# Patient Record
Sex: MALE | Race: OTHER | URBAN - NONMETROPOLITAN AREA
[De-identification: names, ages, dates, MRNs, and addresses within clinical notes are randomized per-mention and may not be internally consistent; named-entity substitution may affect disease eponyms.]

---

## 2017-02-14 NOTE — PROCEDURE NOTE: CLINICAL
PROCEDURE NOTE: Eylea 2mg OD. Diagnosis: Neovascular AMD with Active CNV. Anesthesia: Akten Gel 3.5%. Prep: Betadine Drops. Prior to injection, risks/benefits/alternatives discussed including infection, loss of vision, hemorrhage, cataract, glaucoma, retinal tears or detachment. The patient wished to proceed with treatment. Betadine prep was performed. Topical anesthesia was induced with Alcaine. Additional anesthesia was achieved using drop(s) or injection checked above. A drop of Povidone-iodine 5% ophthalmic solution was instilled over the injection site and in the inferior fornix. Using the syringe provided, Eylea 2.0mg in 0.05 cc was injected into the vitreous cavity. The needle was passed 3.0 mm posterior to the limbus in pseudophakic patients, and 3.5 mm posterior to the limbus in phakic patients. Patient tolerated procedure well. There were no complications. Injection time: 3:45 PM. The eye was irrigated with sterile irrigating solution. Post procedure instructions given. The patient was instructed to return for re-evaluation in approximately 4-12 weeks depending on his/her condition and was told to call immediately if vision decreases and/or if his/her eye becomes red, painful, and/or light sensitive. The patient was instructed to go to the emergency room or call 911 if unable to reach the doctor within an hour or two of trying or calling. The patient was instructed to use Artificial Tears q.i.d. p.r.n for comfort. Kelley Horner

## 2017-02-14 NOTE — PATIENT DISCUSSION
Based on today's exam and after a review of the records, the determination was made for EYLEA 2mg treatment within ONE WEEK AND AGAIN IN 9 WKS

## 2017-04-24 NOTE — PATIENT DISCUSSION
EYBRIIGTTEA AND REPEAT IN 9 WKS - LESS SRF AT 9-10 WKS THEN 10 WKS ON 12 14 16 - TO KEEP 9 WKS AS TRACE FLUID AT 9 AND [facility].

## 2017-04-26 NOTE — PROCEDURE NOTE: CLINICAL
PROCEDURE NOTE: Eylea 2mg OD. Diagnosis: Neovascular AMD with Active CNV. Anesthesia: Akten Gel 3.5%. Prep: Betadine Drops. Prior to injection, risks/benefits/alternatives discussed including infection, loss of vision, hemorrhage, cataract, glaucoma, retinal tears or detachment. The patient wished to proceed with treatment. Betadine prep was performed. Topical anesthesia was induced with Alcaine. Additional anesthesia was achieved using drop(s) or injection checked above. A drop of Povidone-iodine 5% ophthalmic solution was instilled over the injection site and in the inferior fornix. Using the syringe provided, Eylea 2.0mg in 0.05 cc was injected into the vitreous cavity. The remainder of the Eylea in the single-use vial was then discarded in a medical waste disposal container. The needle was passed 3.0 mm posterior to the limbus in pseudophakic patients, and 3.5 mm posterior to the limbus in phakic patients. Patient tolerated procedure well. There were no complications. Injection time: *. The eye was irrigated with sterile irrigating solution. Post procedure instructions given. The patient was instructed to return for re-evaluation in approximately 4-12 weeks depending on his/her condition and was told to call immediately if vision decreases and/or if his/her eye becomes red, painful, and/or light sensitive. The patient was instructed to go to the emergency room or call 911 if unable to reach the doctor within an hour or two of trying or calling. The patient was instructed to use Artificial Tears q.i.d. p.r.n for comfort. Josee Srinivasan

## 2017-07-26 NOTE — PROCEDURE NOTE: CLINICAL
PROCEDURE NOTE: Eylea 2mg OD. Diagnosis: Neovascular AMD with Active CNV. Anesthesia: Akten Gel 3.5%. Prep: Betadine Drops. Prior to injection, risks/benefits/alternatives discussed including infection, loss of vision, hemorrhage, cataract, glaucoma, retinal tears or detachment. The patient wished to proceed with treatment. Betadine prep was performed. Topical anesthesia was induced with Alcaine. Additional anesthesia was achieved using drop(s) or injection checked above. A drop of Povidone-iodine 5% ophthalmic solution was instilled over the injection site and in the inferior fornix. Using the syringe provided, Eylea 2.0mg in 0.05 cc was injected into the vitreous cavity. The remainder of the Eylea in the single-use vial was then discarded in a medical waste disposal container. The needle was passed 3.0 mm posterior to the limbus in pseudophakic patients, and 3.5 mm posterior to the limbus in phakic patients. Patient tolerated procedure well. There were no complications. Injection time: 9:58AM. The eye was irrigated with sterile irrigating solution. Post procedure instructions given. The patient was instructed to return for re-evaluation in approximately 4-12 weeks depending on his/her condition and was told to call immediately if vision decreases and/or if his/her eye becomes red, painful, and/or light sensitive. The patient was instructed to go to the emergency room or call 911 if unable to reach the doctor within an hour or two of trying or calling. The patient was instructed to use Artificial Tears q.i.d. p.r.n for comfort. Loco Kathleen

## 2017-09-27 NOTE — PATIENT DISCUSSION
EYLEA AND REPEAT IN 9 WKS X 2  - LESS SRF AT 9-10 WKS THEN 10 WKS ON 12 14 16 - TO KEEP 9 WKS AS TRACE FLUID AT 9 AND [facility].

## 2017-10-04 NOTE — PROCEDURE NOTE: CLINICAL
PROCEDURE NOTE: Eylea 2mg OD. Diagnosis: Neovascular AMD with Active CNV. Anesthesia: Akten Gel 3.5%. Prep: Betadine Drops. Prior to injection, risks/benefits/alternatives discussed including infection, loss of vision, hemorrhage, cataract, glaucoma, retinal tears or detachment. The patient wished to proceed with treatment. Betadine prep was performed. Topical anesthesia was induced with Alcaine. Additional anesthesia was achieved using drop(s) or injection checked above. A drop of Povidone-iodine 5% ophthalmic solution was instilled over the injection site and in the inferior fornix. Using the syringe provided, Eylea 2.0mg in 0.05 cc was injected into the vitreous cavity. The remainder of the Eylea in the single-use vial was then discarded in a medical waste disposal container. The needle was passed 3.0 mm posterior to the limbus in pseudophakic patients, and 3.5 mm posterior to the limbus in phakic patients. Patient tolerated procedure well. There were no complications. Injection time: *. The eye was irrigated with sterile irrigating solution. Post procedure instructions given. The patient was instructed to return for re-evaluation in approximately 4-12 weeks depending on his/her condition and was told to call immediately if vision decreases and/or if his/her eye becomes red, painful, and/or light sensitive. The patient was instructed to go to the emergency room or call 911 if unable to reach the doctor within an hour or two of trying or calling. The patient was instructed to use Artificial Tears q.i.d. p.r.n for comfort. Theresa Byrne

## 2017-12-11 NOTE — PROCEDURE NOTE: CLINICAL
PROCEDURE NOTE: Eylea 2mg OD. Diagnosis: Neovascular AMD with Active CNV. Anesthesia: Topical. Prep: Betadine Drops. Prior to injection, risks/benefits/alternatives discussed including infection, loss of vision, hemorrhage, cataract, glaucoma, retinal tears or detachment. The patient wished to proceed with treatment. Betadine prep was performed. Topical anesthesia was induced with Alcaine. Additional anesthesia was achieved using drop(s) or injection checked above. A drop of Povidone-iodine 5% ophthalmic solution was instilled over the injection site and in the inferior fornix. Using the syringe provided, Eylea 2.0mg in 0.05 cc was injected into the vitreous cavity. The remainder of the Eylea in the single-use vial was then discarded in a medical waste disposal container. The needle was passed 3.0 mm posterior to the limbus in pseudophakic patients, and 3.5 mm posterior to the limbus in phakic patients. Patient tolerated procedure well. There were no complications. Injection time: *. The eye was irrigated with sterile irrigating solution. Post procedure instructions given. The patient was instructed to return for re-evaluation in approximately 4-12 weeks depending on his/her condition and was told to call immediately if vision decreases and/or if his/her eye becomes red, painful, and/or light sensitive. The patient was instructed to go to the emergency room or call 911 if unable to reach the doctor within an hour or two of trying or calling. The patient was instructed to use Artificial Tears q.i.d. p.r.n for comfort. Eve Rasmussen

## 2018-02-05 NOTE — PATIENT DISCUSSION
3 3 16 ^SRF 11 WKS RETX AND DECREASE F/U TO 10 WKS.
5 19 16 ^SRF AT 10 WKS RETX W/IN 1 WK AND DECREASE F/U TO 8 WEEKS.
7 27 16 IMPROVED 9 WKS, RETX AND REPEAT 9 WKS.
9 16 15 ^ SRF AT 4 MTHS RETX EYLEA DECREASE F/U TO 3 MONTHS.
ARTIFICIAL TEARS to affected eye(s) as needed.
ATYPICAL AS MIN LEAK ON FA BUT SIG SRF - TO CONTINUE WITH ANTI-VEGF AS PT SEEMS TO BE RESPONDING.
BMI above normal limits, recommended weight loss, improve diet and follow up with internist.
Cataract DOES NOT appear visually significant.
Continue to MONITOR CLOSELY to determine the need for TREATMENT and INCREASE/DECREASE in length of time till next follow up visit.
Does NOT APPEAR VISUALLY SIGNIFICANT.
ERM does NOT APPEAR VISUALLY SIGNIFICANT.
EYBRIGITTEA AND REPEAT IN 9 WKS - LESS SRF AT 9-10 WKS THEN 10 WKS ON 12 14 16 - TO KEEP 9 WKS AS TRACE FLUID AT 9 AND [facility].
MONITOR response to TREATMENT.
My findings and recommendations TODAY are based on the patient's symptoms, exam, diagnostic testing and records.
NONSMOKER.
No retinal holes or tears seen on exam. Recommended observation. We reviewed the signs and symptoms of retinal tear/retinal detachment and the importance of prompt evaluation should there be increasing floaters, new flashing lights, or decreasing peripheral vision in either eye at any time. Patient understands condition, prognosis and need for follow up care.
No retinal tears or retinal detachment seen on clinical exam today. Reviewed the signs and symptoms of retinal tear/retinal detachment and the importance of calling for prompt evaluation should there be increasing floaters, new flashing lights, or decreasing peripheral vision in either eye at any time. Observation recommended.
OBSERVATION is recommended at this time. Patient understands condition, prognosis and need for follow up care. Patient instructed to call for any new reduction in vision or distortion.
POST INJECTION EVALUATION, no signs of new infection, tear, RD, VF, EOM, CNS, Vascular or other problems or side effect from previous injection(s).
Recommend OBSERVATION and continued MONITORING for progression to exudative AMD.
Recommended OBSERVATION and continued MONITORING for progression.
Recommended observation.
Recommended weight and BMI control through healthy diet and exercise, green leafy veggies, UV protection, and not smoking. Reviewed the benefits of AREDS II VITAMINS VERUS GENOTYPE directed vitamin therapy, and recommended following one or the other to try and prevent the progression of the disease. Reviewed the importance of daily monitoring of the vision in each eye independently, along with the use of the Amsler grid daily and instructed patient to call and return immediately for any new changes in their vision or on the Amsler grid. Patient instructed on the importance of regular follow up and monitoring for the early detection of conversion to wet AMD as early detection results in early treatment and better outcomes.
The patient is at high risk for a choroidal neovascular membrane. NO CNV SEEN TODAY. Dry ARMD is responsible for some decrease in vision.
Statement Selected

## 2018-02-13 NOTE — PATIENT DISCUSSION
ON 9 27 7 EYLEA AND REPEAT IN 9 WKS X 2  - LESS SRF AT 9-10 WKS THEN 10 WKS ON 12 14 16 - TO KEEP 9 WKS AS TRACE FLUID AT 9 AND [facility].

## 2018-02-13 NOTE — PATIENT DISCUSSION
ON 2 13 18 - EYLEA AND REPEAT IN 8 WKS - MILD SRF AT 9 WKS - MILD INCREASE - THEREFORE RETX AND REDUCE F/U TO 8 WKS.

## 2018-02-13 NOTE — PROCEDURE NOTE: CLINICAL
PROCEDURE NOTE: Eylea 2mg OD. Diagnosis: Neovascular AMD with Active CNV. Anesthesia: Topical. Prep: Betadine Drops. Prior to injection, risks/benefits/alternatives discussed including infection, loss of vision, hemorrhage, cataract, glaucoma, retinal tears or detachment. The patient wished to proceed with treatment. Betadine prep was performed. Topical anesthesia was induced with Alcaine. Additional anesthesia was achieved using drop(s) or injection checked above. A drop of Povidone-iodine 5% ophthalmic solution was instilled over the injection site and in the inferior fornix. Using the syringe provided, Eylea 2.0mg in 0.05 cc was injected into the vitreous cavity. The remainder of the Eylea in the single-use vial was then discarded in a medical waste disposal container. The needle was passed 3.0 mm posterior to the limbus in pseudophakic patients, and 3.5 mm posterior to the limbus in phakic patients. Patient tolerated procedure well. There were no complications. Injection time: 3:00. The eye was irrigated with sterile irrigating solution. Post procedure instructions given. The patient was instructed to return for re-evaluation in approximately 4-12 weeks depending on his/her condition and was told to call immediately if vision decreases and/or if his/her eye becomes red, painful, and/or light sensitive. The patient was instructed to go to the emergency room or call 911 if unable to reach the doctor within an hour or two of trying or calling. The patient was instructed to use Artificial Tears q.i.d. p.r.n for comfort. Maryam Trivedi

## 2018-04-03 NOTE — PATIENT DISCUSSION
3 3 16 ^SRF 11 WKS RETX AND DECREASE F/U TO 10 WKS.
5 19 16 ^SRF AT 10 WKS RETX W/IN 1 WK AND DECREASE F/U TO 8 WEEKS.
7 27 16 IMPROVED 9 WKS, RETX AND REPEAT 9 WKS.
9 16 15 ^ SRF AT 4 MTHS RETX EYLEA DECREASE F/U TO 3 MONTHS.
ARTIFICIAL TEARS to affected eye(s) as needed.
ATYPICAL AS MIN LEAK ON FA BUT SIG SRF - TO CONTINUE WITH ANTI-VEGF AS PT SEEMS TO BE RESPONDING.
BMI above normal limits, recommended weight loss, improve diet and follow up with internist.
Cataract DOES NOT appear visually significant.
Continue to MONITOR CLOSELY to determine the need for TREATMENT and INCREASE/DECREASE in length of time till next follow up visit.
Does NOT APPEAR VISUALLY SIGNIFICANT.
ERM does NOT APPEAR VISUALLY SIGNIFICANT.
EYBRIGITTEA AND REPEAT IN 9 WKS - LESS SRF AT 9-10 WKS THEN 10 WKS ON 12 14 16 - TO KEEP 9 WKS AS TRACE FLUID AT 9 AND [facility].
MONITOR response to TREATMENT.
My findings and recommendations TODAY are based on the patient's symptoms, exam, diagnostic testing and records.
NONSMOKER.
No retinal holes or tears seen on exam. Recommended observation. We reviewed the signs and symptoms of retinal tear/retinal detachment and the importance of prompt evaluation should there be increasing floaters, new flashing lights, or decreasing peripheral vision in either eye at any time. Patient understands condition, prognosis and need for follow up care.
No retinal tears or retinal detachment seen on clinical exam today. Reviewed the signs and symptoms of retinal tear/retinal detachment and the importance of calling for prompt evaluation should there be increasing floaters, new flashing lights, or decreasing peripheral vision in either eye at any time. Observation recommended.
OBSERVATION is recommended at this time. Patient understands condition, prognosis and need for follow up care. Patient instructed to call for any new reduction in vision or distortion.
POST INJECTION EVALUATION, no signs of new infection, tear, RD, VF, EOM, CNS, Vascular or other problems or side effect from previous injection(s).
Recommend OBSERVATION and continued MONITORING for progression to exudative AMD.
Recommended OBSERVATION and continued MONITORING for progression.
Recommended observation.
Recommended weight and BMI control through healthy diet and exercise, green leafy veggies, UV protection, and not smoking. Reviewed the benefits of AREDS II VITAMINS VERUS GENOTYPE directed vitamin therapy, and recommended following one or the other to try and prevent the progression of the disease. Reviewed the importance of daily monitoring of the vision in each eye independently, along with the use of the Amsler grid daily and instructed patient to call and return immediately for any new changes in their vision or on the Amsler grid. Patient instructed on the importance of regular follow up and monitoring for the early detection of conversion to wet AMD as early detection results in early treatment and better outcomes.
The patient is at high risk for a choroidal neovascular membrane. NO CNV SEEN TODAY. Dry ARMD is responsible for some decrease in vision.
- - -

## 2018-04-10 NOTE — PROCEDURE NOTE: CLINICAL
PROCEDURE NOTE: Eylea 2mg OD. Diagnosis: Neovascular AMD with Active CNV. Anesthesia: Akten Gel 3.5%. Prep: Betadine Drops. Prior to injection, risks/benefits/alternatives discussed including infection, loss of vision, hemorrhage, cataract, glaucoma, retinal tears or detachment. The patient wished to proceed with treatment. Betadine prep was performed. Topical anesthesia was induced with Alcaine. Additional anesthesia was achieved using drop(s) or injection checked above. A drop of Povidone-iodine 5% ophthalmic solution was instilled over the injection site and in the inferior fornix. Using the syringe provided, Eylea 2.0mg in 0.05 cc was injected into the vitreous cavity. The remainder of the Eylea in the single-use vial was then discarded in a medical waste disposal container. The needle was passed 3.0 mm posterior to the limbus in pseudophakic patients, and 3.5 mm posterior to the limbus in phakic patients. Patient tolerated procedure well. There were no complications. Injection time: *. The eye was irrigated with sterile irrigating solution. Post procedure instructions given. The patient was instructed to return for re-evaluation in approximately 4-12 weeks depending on his/her condition and was told to call immediately if vision decreases and/or if his/her eye becomes red, painful, and/or light sensitive. The patient was instructed to go to the emergency room or call 911 if unable to reach the doctor within an hour or two of trying or calling. The patient was instructed to use Artificial Tears q.i.d. p.r.n for comfort. Sanchez Skinner

## 2018-05-22 NOTE — PATIENT DISCUSSION
EYLEA AND REPEAT EVERY 6 WKS X 3 - TRACED SRF AT 6 WKS - DOING BETTER THAN AT 8 WKS ON 4 10 18 - REVIEWED RISK OF INFLAMMATION AND PATIENT ACCEPTS RISK AND WANTS TO STICK WITH EYLEA AS SHE DOES NOT WANT TO TAKE CHANGE THAT SHE WOULD REQUIRE MORE FREQUENT TX WITH ANOTHER MEDICATION.

## 2018-05-29 NOTE — PROCEDURE NOTE: CLINICAL
PROCEDURE NOTE: Eylea 2mg OD. Diagnosis: Neovascular AMD with Active CNV. Prior to injection, risks/benefits/alternatives discussed including infection, loss of vision, hemorrhage, cataract, glaucoma, retinal tears or detachment. The patient wished to proceed with treatment. Betadine prep was performed. Topical anesthesia was induced with Alcaine. Additional anesthesia was achieved using drop(s) or injection checked above. A drop of Povidone-iodine 5% ophthalmic solution was instilled over the injection site and in the inferior fornix. Using the syringe provided, Eylea 2.0mg in 0.05 cc was injected into the vitreous cavity. The remainder of the Eylea in the single-use vial was then discarded in a medical waste disposal container. The needle was passed 3.0 mm posterior to the limbus in pseudophakic patients, and 3.5 mm posterior to the limbus in phakic patients. Patient tolerated procedure well. There were no complications. Injection time: *. The eye was irrigated with sterile irrigating solution. Post procedure instructions given. The patient was instructed to return for re-evaluation in approximately 4-12 weeks depending on his/her condition and was told to call immediately if vision decreases and/or if his/her eye becomes red, painful, and/or light sensitive. The patient was instructed to go to the emergency room or call 911 if unable to reach the doctor within an hour or two of trying or calling. The patient was instructed to use Artificial Tears q.i.d. p.r.n for comfort. Gabrielle Tomas

## 2018-06-04 NOTE — PATIENT DISCUSSION
ARMD OU dry - Importance of smoking cessation blood pressure control and healthy diet were emphasized. In accordance with the AREDS study a good multivitamin containing EC and Zinc were recommened to be taken daily. Patient was instructed to self monitor their monocular vision (reading/Amsler Grid) at least weekly. Patient should immediately report any new onset of decreased vision or metamorphopsia. [facility].

## 2018-06-04 NOTE — PATIENT DISCUSSION
1.  Refractive error - rx change optional.2. Combined Types of Cataract OU: Explained how cataracts can effect vision. Recommend clinical observation. The patient was advised to contact us if any change or worsening of vision. 3. Dry Eye OU:  Continue current management with Artificial Tears. 4.  ARMD OU dry - Importance of smoking cessation blood pressure control and healthy diet were emphasized. In accordance with the AREDS study a good multivitamin containing EC and Zinc were recommened to be taken daily. Patient was instructed to self monitor their monocular vision (reading/Amsler Grid) at least weekly. Patient should immediately report any new onset of decreased vision or metamorphopsia. [facility]. 5. Return for an appointment in 1 year for comprehensive exam. [facility] with Dr. Adal Beltran.

## 2018-07-10 NOTE — PROCEDURE NOTE: CLINICAL
PROCEDURE NOTE: Eylea 2mg OD. Diagnosis: Neovascular AMD with Active CNV. Anesthesia: Topical. Prep: Betadine Drops. Prior to injection, risks/benefits/alternatives discussed including infection, loss of vision, hemorrhage, cataract, glaucoma, retinal tears or detachment. The patient wished to proceed with treatment. Betadine prep was performed. Topical anesthesia was induced with Alcaine. Additional anesthesia was achieved using drop(s) or injection checked above. A drop of Povidone-iodine 5% ophthalmic solution was instilled over the injection site and in the inferior fornix. Using the syringe provided, Eylea 2.0mg in 0.05 cc was injected into the vitreous cavity. The remainder of the Eylea in the single-use vial was then discarded in a medical waste disposal container. The needle was passed 3.0 mm posterior to the limbus in pseudophakic patients, and 3.5 mm posterior to the limbus in phakic patients. Patient tolerated procedure well. There were no complications. Injection time: 2:10PM. The eye was irrigated with sterile irrigating solution. Post procedure instructions given. The patient was instructed to return for re-evaluation in approximately 4-12 weeks depending on his/her condition and was told to call immediately if vision decreases and/or if his/her eye becomes red, painful, and/or light sensitive. The patient was instructed to go to the emergency room or call 911 if unable to reach the doctor within an hour or two of trying or calling. The patient was instructed to use Artificial Tears q.i.d. p.r.n for comfort. Sanchez Greening PROCEDURE NOTE: Bandage Contact Lens OD. Diagnosis: Dry Eye Syndrome. Prior to treatment, the risks/benefits/alternatives were discussed. The patient wished to proceed with procedure. A topical anesthetic, proparacaine, was administered. A bandage contact lens was fitted for treatment of ocular surface disease. Night and day contact lens (-0.00, 13.8, 8.6), fitting and placement done without complication. Expiration date: *. Lot #: *. Patient tolerated procedure well. There were no complications. Post procedure instructions given. The patient tolerated the procedure well and left in good condition. Patient is instructed to make sure it is removed in 24 hours, either here, at home, or by another eye doctor. Sanchez Greening

## 2018-08-27 NOTE — PROCEDURE NOTE: CLINICAL
PROCEDURE NOTE: Bandage Contact Lens OD. Diagnosis: Dry Eye Syndrome. Prior to treatment, the risks/benefits/alternatives were discussed. The patient wished to proceed with procedure. A topical anesthetic, proparacaine, was administered. A bandage contact lens was fitted for treatment of ocular surface disease. Night and day contact lens (-0.00, 13.8, 8.6), fitting and placement done without complication. Expiration date: SEE ABOVE. Lot #: *. Patient tolerated procedure well. There were no complications. Post procedure instructions given. The patient tolerated the procedure well and left in good condition. Patient is instructed to make sure it is removed in 24 hours, either here, at home, or by another eye doctor. Sarah Brice PROCEDURE NOTE: Eylea 2mg OD. Diagnosis: Neovascular AMD with Active CNV. Anesthesia: Topical. Prep: Betadine Drops. Prior to injection, risks/benefits/alternatives discussed including infection, loss of vision, hemorrhage, cataract, glaucoma, retinal tears or detachment. The patient wished to proceed with treatment. Betadine prep was performed. Topical anesthesia was induced with Alcaine. Additional anesthesia was achieved using drop(s) or injection checked above. A drop of Povidone-iodine 5% ophthalmic solution was instilled over the injection site and in the inferior fornix. Using the syringe provided, Eylea 2.0mg in 0.05 cc was injected into the vitreous cavity. The remainder of the Eylea in the single-use vial was then discarded in a medical waste disposal container. The needle was passed 3.0 mm posterior to the limbus in pseudophakic patients, and 3.5 mm posterior to the limbus in phakic patients. Patient tolerated procedure well. There were no complications. Injection time: 2:25pm. The eye was irrigated with sterile irrigating solution. Post procedure instructions given. The patient was instructed to return for re-evaluation in approximately 4-12 weeks depending on his/her condition and was told to call immediately if vision decreases and/or if his/her eye becomes red, painful, and/or light sensitive. The patient was instructed to go to the emergency room or call 911 if unable to reach the doctor within an hour or two of trying or calling. The patient was instructed to use Artificial Tears q.i.d. p.r.n for comfort. Sarah Brice

## 2018-10-09 NOTE — PROCEDURE NOTE: CLINICAL
PROCEDURE NOTE: Eylea 2mg OD. Diagnosis: Neovascular AMD with Active CNV. Prior to injection, risks/benefits/alternatives discussed including infection, loss of vision, hemorrhage, cataract, glaucoma, retinal tears or detachment. The patient wished to proceed with treatment. Betadine prep was performed. Topical anesthesia was induced with Alcaine. Additional anesthesia was achieved using drop(s) or injection checked above. A drop of Povidone-iodine 5% ophthalmic solution was instilled over the injection site and in the inferior fornix. Using the syringe provided, Eylea 2.0mg in 0.05 cc was injected into the vitreous cavity. The remainder of the Eylea in the single-use vial was then discarded in a medical waste disposal container. The needle was passed 3.0 mm posterior to the limbus in pseudophakic patients, and 3.5 mm posterior to the limbus in phakic patients. Patient tolerated procedure well. There were no complications. Injection time: *. The eye was irrigated with sterile irrigating solution. Post procedure instructions given. The patient was instructed to return for re-evaluation in approximately 4-12 weeks depending on his/her condition and was told to call immediately if vision decreases and/or if his/her eye becomes red, painful, and/or light sensitive. The patient was instructed to go to the emergency room or call 911 if unable to reach the doctor within an hour or two of trying or calling. The patient was instructed to use Artificial Tears q.i.d. p.r.n for comfort. Chayo Johnson PROCEDURE NOTE: Bandage Contact Lens OD. Diagnosis: Dry Eye Syndrome. Anesthesia: Lidocaine 4%. Prior to treatment, the risks/benefits/alternatives were discussed. The patient wished to proceed with procedure. A topical anesthetic, proparacaine, was administered. A bandage contact lens was fitted for treatment of ocular surface disease. Night and day contact lens (-0.00, 13.8, 8.6), fitting and placement done without complication. Expiration date: *. Lot #: *. Patient tolerated procedure well. There were no complications. Post procedure instructions given. The patient tolerated the procedure well and left in good condition. Patient is instructed to make sure it is removed in 24 hours, either here, at home, or by another eye doctor. Chayo Johnson

## 2018-12-04 NOTE — PROCEDURE NOTE: CLINICAL
PROCEDURE NOTE: Eylea 2mg OD. Diagnosis: Neovascular AMD with Active CNV. Prior to injection, risks/benefits/alternatives discussed including infection, loss of vision, hemorrhage, cataract, glaucoma, retinal tears or detachment. The patient wished to proceed with treatment. Betadine prep was performed. Topical anesthesia was induced with Alcaine. Additional anesthesia was achieved using drop(s) or injection checked above. A drop of Povidone-iodine 5% ophthalmic solution was instilled over the injection site and in the inferior fornix. Using the syringe provided, Eylea 2.0mg in 0.05 cc was injected into the vitreous cavity. The remainder of the Eylea in the single-use vial was then discarded in a medical waste disposal container. The needle was passed 3.0 mm posterior to the limbus in pseudophakic patients, and 3.5 mm posterior to the limbus in phakic patients. Patient tolerated procedure well. There were no complications. Injection time: 2:25 PM. The eye was irrigated with sterile irrigating solution. Post procedure instructions given. The patient was instructed to return for re-evaluation in approximately 4-12 weeks depending on his/her condition and was told to call immediately if vision decreases and/or if his/her eye becomes red, painful, and/or light sensitive. The patient was instructed to go to the emergency room or call 911 if unable to reach the doctor within an hour or two of trying or calling. The patient was instructed to use Artificial Tears q.i.d. p.r.n for comfort. Josee Dent PROCEDURE NOTE: Bandage Contact Lens OD. Diagnosis: Dry Eye Syndrome. Prior to treatment, the risks/benefits/alternatives were discussed. The patient wished to proceed with procedure. A topical anesthetic, proparacaine, was administered. A bandage contact lens was fitted for treatment of ocular surface disease. Night and day contact lens (-0.00, 13.8, 8.6), fitting and placement done without complication. Expiration date: *. Lot #: *. Patient tolerated procedure well. There were no complications. Post procedure instructions given. The patient tolerated the procedure well and left in good condition. Patient is instructed to make sure it is removed in 24 hours, either here, at home, or by another eye doctor. Josee Srinivasan

## 2019-01-15 NOTE — PROCEDURE NOTE: CLINICAL
PROCEDURE NOTE: Eylea 2mg OD. Diagnosis: Neovascular AMD with Active CNV. Anesthesia: Akten Gel 3.5%. Prep: Betadine Drops. Prior to injection, risks/benefits/alternatives discussed including infection, loss of vision, hemorrhage, cataract, glaucoma, retinal tears or detachment. The patient wished to proceed with treatment. Betadine prep was performed. Topical anesthesia was induced with Alcaine. Additional anesthesia was achieved using drop(s) or injection checked above. A drop of Povidone-iodine 5% ophthalmic solution was instilled over the injection site and in the inferior fornix. Using the syringe provided, Eylea 2.0mg in 0.05 cc was injected into the vitreous cavity. The remainder of the Eylea in the single-use vial was then discarded in a medical waste disposal container. The needle was passed 3.0 mm posterior to the limbus in pseudophakic patients, and 3.5 mm posterior to the limbus in phakic patients. Patient tolerated procedure well. There were no complications. Injection time: *. The eye was irrigated with sterile irrigating solution. Post procedure instructions given. The patient was instructed to return for re-evaluation in approximately 4-12 weeks depending on his/her condition and was told to call immediately if vision decreases and/or if his/her eye becomes red, painful, and/or light sensitive. The patient was instructed to go to the emergency room or call 911 if unable to reach the doctor within an hour or two of trying or calling. The patient was instructed to use Artificial Tears q.i.d. p.r.n for comfort. Deuce Pabon PROCEDURE NOTE: Bandage Contact Lens OD. Diagnosis: Dry Eye Syndrome. Prior to treatment, the risks/benefits/alternatives were discussed. The patient wished to proceed with procedure. A topical anesthetic, proparacaine, was administered. A bandage contact lens was fitted for treatment of ocular surface disease. Night and day contact lens (-0.00, 13.8, 8.6), fitting and placement done without complication. Expiration date: *. Lot #: *. Patient tolerated procedure well. There were no complications. Post procedure instructions given. The patient tolerated the procedure well and left in good condition. Patient is instructed to make sure it is removed in 24 hours, either here, at home, or by another eye doctor. Deuce Pabon

## 2019-02-26 NOTE — PATIENT DISCUSSION
Cataract DOES NOT appear visually significant. Writer arranged discharge transportation for pt through [facility] #[phone]. Transportation was set up with [employer] #[phone].  time is 10am with invoice #[id number]. Pt and staff aware of d/c arrangements.

## 2019-02-26 NOTE — PROCEDURE NOTE: CLINICAL
PROCEDURE NOTE: Eylea 2mg OD. Diagnosis: Neovascular AMD with Active CNV. Prior to injection, risks/benefits/alternatives discussed including infection, loss of vision, hemorrhage, cataract, glaucoma, retinal tears or detachment. The patient wished to proceed with treatment. Betadine prep was performed. Topical anesthesia was induced with Alcaine. Additional anesthesia was achieved using drop(s) or injection checked above. A drop of Povidone-iodine 5% ophthalmic solution was instilled over the injection site and in the inferior fornix. Using the syringe provided, Eylea 2.0mg in 0.05 cc was injected into the vitreous cavity. The remainder of the Eylea in the single-use vial was then discarded in a medical waste disposal container. The needle was passed 3.0 mm posterior to the limbus in pseudophakic patients, and 3.5 mm posterior to the limbus in phakic patients. Patient tolerated procedure well. There were no complications. Injection time: *. The eye was irrigated with sterile irrigating solution. Post procedure instructions given. The patient was instructed to return for re-evaluation in approximately 4-12 weeks depending on his/her condition and was told to call immediately if vision decreases and/or if his/her eye becomes red, painful, and/or light sensitive. The patient was instructed to go to the emergency room or call 911 if unable to reach the doctor within an hour or two of trying or calling. The patient was instructed to use Artificial Tears q.i.d. p.r.n for comfort. Clau Philip PROCEDURE NOTE: Bandage Contact Lens OD. Diagnosis: Dry Eye Syndrome. Anesthesia: Lidocaine 4%. Prior to treatment, the risks/benefits/alternatives were discussed. The patient wished to proceed with procedure. A topical anesthetic, proparacaine, was administered. A bandage contact lens was fitted for treatment of ocular surface disease. Night and day contact lens (-0.00, 13.8, 8.6), fitting and placement done without complication. Expiration date: *. Lot #: *. Patient tolerated procedure well. There were no complications. Post procedure instructions given. The patient tolerated the procedure well and left in good condition. Patient is instructed to make sure it is removed in 24 hours, either here, at home, or by another eye doctor. Clau Philip

## 2019-04-09 NOTE — PROCEDURE NOTE: CLINICAL
PROCEDURE NOTE: Eylea 2mg OD. Diagnosis: Neovascular AMD with Active CNV. Anesthesia: Lidocaine 4%. Prep: Betadine Drops. Prior to injection, risks/benefits/alternatives discussed including infection, loss of vision, hemorrhage, cataract, glaucoma, retinal tears or detachment. The patient wished to proceed with treatment. Betadine prep was performed. Topical anesthesia was induced with Alcaine. Additional anesthesia was achieved using drop(s) or injection checked above. A drop of Povidone-iodine 5% ophthalmic solution was instilled over the injection site and in the inferior fornix. Using the syringe provided, Eylea 2.0mg in 0.05 cc was injected into the vitreous cavity. The remainder of the Eylea in the single-use vial was then discarded in a medical waste disposal container. The needle was passed 3.0 mm posterior to the limbus in pseudophakic patients, and 3.5 mm posterior to the limbus in phakic patients. Patient tolerated procedure well. There were no complications. Injection time: 2:15 PM. The eye was irrigated with sterile irrigating solution. Post procedure instructions given. The patient was instructed to return for re-evaluation in approximately 4-12 weeks depending on his/her condition and was told to call immediately if vision decreases and/or if his/her eye becomes red, painful, and/or light sensitive. The patient was instructed to go to the emergency room or call 911 if unable to reach the doctor within an hour or two of trying or calling. The patient was instructed to use Artificial Tears q.i.d. p.r.n for comfort. Brooks Osier PROCEDURE NOTE: Bandage Contact Lens OD. Diagnosis: Dry Eye Syndrome. Prior to treatment, the risks/benefits/alternatives were discussed. The patient wished to proceed with procedure. A topical anesthetic, proparacaine, was administered. A bandage contact lens was fitted for treatment of ocular surface disease. Night and day contact lens (-0.00, 13.8, 8.6), fitting and placement done without complication. Expiration date: *. Lot #: *. Patient tolerated procedure well. There were no complications. Post procedure instructions given. The patient tolerated the procedure well and left in good condition. Patient is instructed to make sure it is removed in 24 hours, either here, at home, or by another eye doctor. Brooks Osier

## 2019-05-28 NOTE — PATIENT DISCUSSION
5 28 19 EYLEA AND REPEAT EVERY 7 WKS X 3 - TRACE FLUID AT 7 WKS  - RETX AND KEEP 7 WKS AS DOING WELL AT 7 WKS.

## 2019-05-28 NOTE — PATIENT DISCUSSION
11 29 18 EYLEA OD AND RPEEAT EVERY 6 WKS X 3 - TRACE EDEMA AT 7 WKS - RETX AND KEEP 6 WKS AS STILL FLUID.

## 2019-05-28 NOTE — PROCEDURE NOTE: CLINICAL
PROCEDURE NOTE: Eylea 2mg OD. Diagnosis: Neovascular AMD with Active CNV. Anesthesia: Topical. Prep: Betadine Drops. Prior to injection, risks/benefits/alternatives discussed including infection, loss of vision, hemorrhage, cataract, glaucoma, retinal tears or detachment. The patient wished to proceed with treatment. Betadine prep was performed. Topical anesthesia was induced with Alcaine. Additional anesthesia was achieved using drop(s) or injection checked above. A drop of Povidone-iodine 5% ophthalmic solution was instilled over the injection site and in the inferior fornix. Using the syringe provided, Eylea 2.0mg in 0.05 cc was injected into the vitreous cavity. The remainder of the Eylea in the single-use vial was then discarded in a medical waste disposal container. The needle was passed 3.0 mm posterior to the limbus in pseudophakic patients, and 3.5 mm posterior to the limbus in phakic patients. Patient tolerated procedure well. There were no complications. Injection time: *. The eye was irrigated with sterile irrigating solution. Post procedure instructions given. The patient was instructed to return for re-evaluation in approximately 4-12 weeks depending on his/her condition and was told to call immediately if vision decreases and/or if his/her eye becomes red, painful, and/or light sensitive. The patient was instructed to go to the emergency room or call 911 if unable to reach the doctor within an hour or two of trying or calling. The patient was instructed to use Artificial Tears q.i.d. p.r.n for comfort. Shelvia Yeyo PROCEDURE NOTE: Bandage Contact Lens OD. Diagnosis: Dry Eye Syndrome. Anesthesia: Lidocaine 4%. Prior to treatment, the risks/benefits/alternatives were discussed. The patient wished to proceed with procedure. A topical anesthetic, proparacaine, was administered. A bandage contact lens was fitted for treatment of ocular surface disease. Night and day contact lens (-0.00, 13.8, 8.6), fitting and placement done without complication. Expiration date: *. Lot #: *. Patient tolerated procedure well. There were no complications. Post procedure instructions given. The patient tolerated the procedure well and left in good condition. Patient is instructed to make sure it is removed in 24 hours, either here, at home, or by another eye doctor. Shelvia Yeyo

## 2019-07-08 NOTE — PROCEDURE NOTE: CLINICAL
PROCEDURE NOTE: Eylea 2mg OD. Diagnosis: Neovascular AMD with Active CNV. Anesthesia: Lidocaine 4%. Prep: Betadine Drops. Prior to injection, risks/benefits/alternatives discussed including infection, loss of vision, hemorrhage, cataract, glaucoma, retinal tears or detachment. The patient wished to proceed with treatment. Betadine prep was performed. Topical anesthesia was induced with Alcaine. Additional anesthesia was achieved using drop(s) or injection checked above. A drop of Povidone-iodine 5% ophthalmic solution was instilled over the injection site and in the inferior fornix. Using the syringe provided, Eylea 2.0mg in 0.05 cc was injected into the vitreous cavity. The remainder of the Eylea in the single-use vial was then discarded in a medical waste disposal container. The needle was passed 3.0 mm posterior to the limbus in pseudophakic patients, and 3.5 mm posterior to the limbus in phakic patients. Patient tolerated procedure well. There were no complications. Injection time: 7:00PM. The eye was irrigated with sterile irrigating solution. Post procedure instructions given. The patient was instructed to return for re-evaluation in approximately 4-12 weeks depending on his/her condition and was told to call immediately if vision decreases and/or if his/her eye becomes red, painful, and/or light sensitive. The patient was instructed to go to the emergency room or call 911 if unable to reach the doctor within an hour or two of trying or calling. The patient was instructed to use Artificial Tears q.i.d. p.r.n for comfort. Charly Castillo PROCEDURE NOTE: Bandage Contact Lens OD. Diagnosis: Dry Eye Syndrome. Anesthesia: Lidocaine 4%. Prior to treatment, the risks/benefits/alternatives were discussed. The patient wished to proceed with procedure. A topical anesthetic, proparacaine, was administered. A bandage contact lens was fitted for treatment of ocular surface disease. Night and day contact lens (-0.00, 13.8, 8.6), fitting and placement done without complication. Expiration date: *. Lot #: *. Patient tolerated procedure well. There were no complications. Post procedure instructions given. The patient tolerated the procedure well and left in good condition. Patient is instructed to make sure it is removed in 24 hours, either here, at home, or by another eye doctor. Charly Castillo

## 2019-07-09 NOTE — PATIENT DISCUSSION
EYLEA THEN PNEUMATIC GAS DISPLACEMENT  - NEW [facility] - RETX AND SHORTEN F/U TO 30 DAYS - TO PROCEED WITH PNEUMATIC AS PLANNED.

## 2019-07-09 NOTE — PROCEDURE NOTE: CLINICAL
PROCEDURE NOTE: Pneumatic Hemopexy OD. Diagnosis: Neovascular AMD with Active CNV. Anesthesia: Lidocaine 4%. Prep: Betadine Drops. The patient was brought to the treatment room where local anesthesia was achieved by a 5 cc injection of 0.75% Marcaine in a 50/50 mixture with 2% Xylocaine, which was given in a retrobulbar fashion. After adequate anesthesia, the areas around the eye with the subretinal hemorrhage were prepped and draped in the usual sterile fashion. A lid speculum was placed into the operative eye and removed prior to the end of the procedure. A drop of topical 5% iodine solution was applied to the globe. The patient was placed on their side and an area 3.5 mm posterior to the corneoscleral limbus was marked in the INFERIOR TEMPORAL quadrant. A 0.7 cc injection of  100% C3F8 was made under direct visualization into the vitreous cavity. After the injection, the patient was rotated to their opposite side, and the needle was withdrawn from the vitreous cavity. Indirect ophthalmoscopy revealed the central retinal artery to be hypoperfused indicating acute glaucoma/pressure elevation. A therapeutic paracentesis was performed and 0.35 of fluid was removed. THE NERVE WAS PERFUSED. COMBIGAN AND LUMIGAN WERE INSTILLED FOR IOP CONTROL AND PT WAS GIVEN DIAMOX 500 MG. The intraocular pressure 10 minutes after the injection was * mmHg. Tobramycin drops were placed into the eye and a sterile patch was placed over the eye. The patient was instructed in postoperative positioning and was instructed to remain for 30-60 minutes for a repeat pressure check(s). The intraocular pressure 30-60 minutes postoperatively was * mmHg. The patient was then discharged home on Tobramycin q.i.d. and Pred Forte q.i.d. The patient tolerated the procedure well and left the treatment room in stable condition. Patient was instructed to look at center of gravity 50% of the time during the day and to sleep on the side that did not have surgery. Erika Das

## 2019-08-07 NOTE — PROCEDURE NOTE: CLINICAL
PROCEDURE NOTE: Eylea 2mg OD. Diagnosis: Neovascular AMD with Active CNV. Prior to injection, risks/benefits/alternatives discussed including infection, loss of vision, hemorrhage, cataract, glaucoma, retinal tears or detachment. The patient wished to proceed with treatment. Betadine prep was performed. Topical anesthesia was induced with Alcaine. Additional anesthesia was achieved using drop(s) or injection checked above. A drop of Povidone-iodine 5% ophthalmic solution was instilled over the injection site and in the inferior fornix. Using the syringe provided, Eylea 2.0mg in 0.05 cc was injected into the vitreous cavity. The remainder of the Eylea in the single-use vial was then discarded in a medical waste disposal container. The needle was passed 3.0 mm posterior to the limbus in pseudophakic patients, and 3.5 mm posterior to the limbus in phakic patients. Patient tolerated procedure well. There were no complications. Injection time: *. The eye was irrigated with sterile irrigating solution. Post procedure instructions given. The patient was instructed to return for re-evaluation in approximately 4-12 weeks depending on his/her condition and was told to call immediately if vision decreases and/or if his/her eye becomes red, painful, and/or light sensitive. The patient was instructed to go to the emergency room or call 911 if unable to reach the doctor within an hour or two of trying or calling. The patient was instructed to use Artificial Tears q.i.d. p.r.n for comfort. Guzman Troncoso PROCEDURE NOTE: Bandage Contact Lens OD. Diagnosis: Dry Eye Syndrome. Anesthesia: Lidocaine 4%. Prior to treatment, the risks/benefits/alternatives were discussed. The patient wished to proceed with procedure. A topical anesthetic, proparacaine, was administered. A bandage contact lens was fitted for treatment of ocular surface disease. Night and day contact lens (-0.00, 13.8, 8.6), fitting and placement done without complication. Expiration date: 452PM.  Lot #: *. Patient tolerated procedure well. There were no complications. Post procedure instructions given. The patient tolerated the procedure well and left in good condition. Patient is instructed to make sure it is removed in 24 hours, either here, at home, or by another eye doctor. Guzman Troncoso

## 2019-09-09 NOTE — PROCEDURE NOTE: CLINICAL
PROCEDURE NOTE: Eylea 2mg OD. Diagnosis: Neovascular AMD with Active CNV. Anesthesia: Topical. Prep: Betadine Drops. Prior to injection, risks/benefits/alternatives discussed including infection, loss of vision, hemorrhage, cataract, glaucoma, retinal tears or detachment. The patient wished to proceed with treatment. Betadine prep was performed. Topical anesthesia was induced with Alcaine. Additional anesthesia was achieved using drop(s) or injection checked above. A drop of Povidone-iodine 5% ophthalmic solution was instilled over the injection site and in the inferior fornix. Using the syringe provided, Eylea 2.0mg in 0.05 cc was injected into the vitreous cavity. The remainder of the Eylea in the single-use vial was then discarded in a medical waste disposal container. The needle was passed 3.0 mm posterior to the limbus in pseudophakic patients, and 3.5 mm posterior to the limbus in phakic patients. Patient tolerated procedure well. There were no complications. Injection time: *. The eye was irrigated with sterile irrigating solution. Post procedure instructions given. The patient was instructed to return for re-evaluation in approximately 4-12 weeks depending on his/her condition and was told to call immediately if vision decreases and/or if his/her eye becomes red, painful, and/or light sensitive. The patient was instructed to go to the emergency room or call 911 if unable to reach the doctor within an hour or two of trying or calling. The patient was instructed to use Artificial Tears q.i.d. p.r.n for comfort. Fatou Pollen PROCEDURE NOTE: Bandage Contact Lens OD. Diagnosis: Dry Eye Syndrome. Prior to treatment, the risks/benefits/alternatives were discussed. The patient wished to proceed with procedure. A topical anesthetic, proparacaine, was administered. A bandage contact lens was fitted for treatment of ocular surface disease. Night and day contact lens (-0.00, 13.8, 8.6), fitting and placement done without complication. Expiration date: *. Lot #: *. Patient tolerated procedure well. There were no complications. Post procedure instructions given. The patient tolerated the procedure well and left in good condition. Patient is instructed to make sure it is removed in 24 hours, either here, at home, or by another eye doctor. Fatou Pollen

## 2019-10-16 NOTE — PROCEDURE NOTE: CLINICAL
PROCEDURE NOTE: Eylea 2mg OD. Diagnosis: Neovascular AMD with Active CNV. Prior to injection, risks/benefits/alternatives discussed including infection, loss of vision, hemorrhage, cataract, glaucoma, retinal tears or detachment. The patient wished to proceed with treatment. Betadine prep was performed. Topical anesthesia was induced with Alcaine. Additional anesthesia was achieved using drop(s) or injection checked above. A drop of Povidone-iodine 5% ophthalmic solution was instilled over the injection site and in the inferior fornix. Using the syringe provided, Eylea 2.0mg in 0.05 cc was injected into the vitreous cavity. The remainder of the Eylea in the single-use vial was then discarded in a medical waste disposal container. The needle was passed 3.0 mm posterior to the limbus in pseudophakic patients, and 3.5 mm posterior to the limbus in phakic patients. Patient tolerated procedure well. There were no complications. Injection time: 3:00 pm. The eye was irrigated with sterile irrigating solution. Post procedure instructions given. The patient was instructed to return for re-evaluation in approximately 4-12 weeks depending on his/her condition and was told to call immediately if vision decreases and/or if his/her eye becomes red, painful, and/or light sensitive. The patient was instructed to go to the emergency room or call 911 if unable to reach the doctor within an hour or two of trying or calling. The patient was instructed to use Artificial Tears q.i.d. p.r.n for comfort. [facility] PROCEDURE NOTE: Bandage Contact Lens OD. Diagnosis: Dry Eye Syndrome. Prior to treatment, the risks/benefits/alternatives were discussed. The patient wished to proceed with procedure. A topical anesthetic, proparacaine, was administered. A bandage contact lens was fitted for treatment of ocular surface disease. Night and day contact lens (-0.00, 13.8, 8.6), fitting and placement done without complication. Expiration date: *. Lot #: *. Patient tolerated procedure well. There were no complications. Post procedure instructions given. The patient tolerated the procedure well and left in good condition. Patient is instructed to make sure it is removed in 24 hours, either here, at home, or by another eye doctor. [facility]

## 2019-10-16 NOTE — PROCEDURE NOTE: CLINICAL
PROCEDURE NOTE: Eylea 2mg OD. Diagnosis: Neovascular AMD with Active CNV. Prior to injection, risks/benefits/alternatives discussed including infection, loss of vision, hemorrhage, cataract, glaucoma, retinal tears or detachment. The patient wished to proceed with treatment. Betadine prep was performed. Topical anesthesia was induced with Alcaine. Additional anesthesia was achieved using drop(s) or injection checked above. A drop of Povidone-iodine 5% ophthalmic solution was instilled over the injection site and in the inferior fornix. Using the syringe provided, Eylea 2.0mg in 0.05 cc was injected into the vitreous cavity. The remainder of the Eylea in the single-use vial was then discarded in a medical waste disposal container. The needle was passed 3.0 mm posterior to the limbus in pseudophakic patients, and 3.5 mm posterior to the limbus in phakic patients. Patient tolerated procedure well. There were no complications. Injection time: 3:00 pm. The eye was irrigated with sterile irrigating solution. Post procedure instructions given. The patient was instructed to return for re-evaluation in approximately 4-12 weeks depending on his/her condition and was told to call immediately if vision decreases and/or if his/her eye becomes red, painful, and/or light sensitive. The patient was instructed to go to the emergency room or call 911 if unable to reach the doctor within an hour or two of trying or calling. The patient was instructed to use Artificial Tears q.i.d. p.r.n for comfort. [facility] PROCEDURE NOTE: Bandage Contact Lens OD. Diagnosis: Dry Eye Syndrome. Prior to treatment, the risks/benefits/alternatives were discussed. The patient wished to proceed with procedure. A topical anesthetic, proparacaine, was administered. A bandage contact lens was fitted for treatment of ocular surface disease. Night and day contact lens (-0.00, 13.8, 8.6), fitting and placement done without complication. Expiration date: *. Lot #: *. Patient tolerated procedure well. There were no complications. Post procedure instructions given. The patient tolerated the procedure well and left in good condition. Patient is instructed to make sure it is removed in 24 hours, either here, at home, or by another eye doctor. [facility]

## 2019-10-16 NOTE — PATIENT DISCUSSION
EYLEA 2 MG THEN REPEAT EVERY 29 DAYS X 3 - IMPROVING BUT STILL SRH AND TRACE EDEMA AT 5 WKS - RETX AND KEEP 29 DAYS.

## 2019-11-14 NOTE — PROCEDURE NOTE: CLINICAL
PROCEDURE NOTE: Eylea 2mg OD. Diagnosis: Neovascular AMD with Active CNV. Anesthesia: Lidocaine 4%. Prep: Betadine Drops. Prior to injection, risks/benefits/alternatives discussed including infection, loss of vision, hemorrhage, cataract, glaucoma, retinal tears or detachment. The patient wished to proceed with treatment. Betadine prep was performed. Topical anesthesia was induced with Alcaine. Additional anesthesia was achieved using drop(s) or injection checked above. A drop of Povidone-iodine 5% ophthalmic solution was instilled over the injection site and in the inferior fornix. Using the syringe provided, Eylea 2.0mg in 0.05 cc was injected into the vitreous cavity. The remainder of the Eylea in the single-use vial was then discarded in a medical waste disposal container. The needle was passed 3.0 mm posterior to the limbus in pseudophakic patients, and 3.5 mm posterior to the limbus in phakic patients. Patient tolerated procedure well. There were no complications. Injection time: *. The eye was irrigated with sterile irrigating solution. Post procedure instructions given. The patient was instructed to return for re-evaluation in approximately 4-12 weeks depending on his/her condition and was told to call immediately if vision decreases and/or if his/her eye becomes red, painful, and/or light sensitive. The patient was instructed to go to the emergency room or call 911 if unable to reach the doctor within an hour or two of trying or calling. The patient was instructed to use Artificial Tears q.i.d. p.r.n for comfort. Rosario Casey

## 2019-12-16 NOTE — PROCEDURE NOTE: CLINICAL
PROCEDURE NOTE: Eylea 2mg OD. Diagnosis: Neovascular AMD with Active CNV. Anesthesia: Topical. Prep: Betadine Drops. Prior to injection, risks/benefits/alternatives discussed including infection, loss of vision, hemorrhage, cataract, glaucoma, retinal tears or detachment. The patient wished to proceed with treatment. Betadine prep was performed. Topical anesthesia was induced with Alcaine. Additional anesthesia was achieved using drop(s) or injection checked above. A drop of Povidone-iodine 5% ophthalmic solution was instilled over the injection site and in the inferior fornix. Using the syringe provided, Eylea 2.0mg in 0.05 cc was injected into the vitreous cavity. The remainder of the Eylea in the single-use vial was then discarded in a medical waste disposal container. The needle was passed 3.0 mm posterior to the limbus in pseudophakic patients, and 3.5 mm posterior to the limbus in phakic patients. Patient tolerated procedure well. There were no complications. Injection time: 2:12PM. The eye was irrigated with sterile irrigating solution. Post procedure instructions given. The patient was instructed to return for re-evaluation in approximately 4-12 weeks depending on his/her condition and was told to call immediately if vision decreases and/or if his/her eye becomes red, painful, and/or light sensitive. The patient was instructed to go to the emergency room or call 911 if unable to reach the doctor within an hour or two of trying or calling. The patient was instructed to use Artificial Tears q.i.d. p.r.n for comfort. Srinivas Nogueira

## 2020-01-16 NOTE — PROCEDURE NOTE: CLINICAL
PROCEDURE NOTE: Eylea 2mg OD. Diagnosis: Neovascular AMD with Active CNV. Anesthesia: Lidocaine 4%. Prep: Betadine Drops. Prior to injection, risks/benefits/alternatives discussed including infection, loss of vision, hemorrhage, cataract, glaucoma, retinal tears or detachment. The patient wished to proceed with treatment. Betadine prep was performed. Topical anesthesia was induced with Alcaine. Additional anesthesia was achieved using drop(s) or injection checked above. A drop of Povidone-iodine 5% ophthalmic solution was instilled over the injection site and in the inferior fornix. Using the syringe provided, Eylea 2.0mg in 0.05 cc was injected into the vitreous cavity. The remainder of the Eylea in the single-use vial was then discarded in a medical waste disposal container. The needle was passed 3.0 mm posterior to the limbus in pseudophakic patients, and 3.5 mm posterior to the limbus in phakic patients. Patient tolerated procedure well. There were no complications. Injection time: 1050. The eye was irrigated with sterile irrigating solution. Post procedure instructions given. The patient was instructed to return for re-evaluation in approximately 4-12 weeks depending on his/her condition and was told to call immediately if vision decreases and/or if his/her eye becomes red, painful, and/or light sensitive. The patient was instructed to go to the emergency room or call 911 if unable to reach the doctor within an hour or two of trying or calling. The patient was instructed to use Artificial Tears q.i.d. p.r.n for comfort. Sanchez Skinner

## 2020-02-26 NOTE — PATIENT DISCUSSION
EYLEA 2 MG THEN REPEAT EVERY 5 WKS  X 4 - IMPROVING BUT STILL SRH AND TRACE EDEMA AT 6 WKS - RETX AND REPEAT IN 5 WKS - TO HOLD ON EXTENDING FURTHER AS SRH HAPPENED AT 6 WKS.

## 2020-02-26 NOTE — PATIENT DISCUSSION
ERM does NOT APPEAR VISUALLY SIGNIFICANT. There is recurrence of intraretinal or subretinal fluid on spectral domain OCT today.

## 2020-02-26 NOTE — PROCEDURE NOTE: CLINICAL

## 2020-04-01 NOTE — PROCEDURE NOTE: CLINICAL

## 2020-05-06 NOTE — PROCEDURE NOTE: CLINICAL

## 2020-06-10 NOTE — PROCEDURE NOTE: CLINICAL

## 2020-07-20 NOTE — PROCEDURE NOTE: CLINICAL

## 2020-08-24 NOTE — PATIENT DISCUSSION
EYLEA 2 MG THEN REPEAT EVERY 5 WKS  X 4 - TRACE EDEMA AT 6 WKS - RETX AND REPEAT IN 5 WKS - TO HOLD ON EXTENDING FURTHER AS SRH HAPPENED AT 6 WKS. Your surgery is scheduled for 11/19*/19.    Please report to Procedure Check In Room on the 2nd FLOOR at 9:30 a.m.          INSTRUCTIONS IMPORTANT!!!  ¨ Do not eat or drink after 12 midnight-including water. OK to brush teeth, no   gum, candy or mints!    ¨ Take only these medicines with a small swallow of water-morning of surgery: omeprazole        ____  Proceed to [facility] on 11/13/19 for additional testing.        ____  Do not wear makeup, including mascara.  ____  No powder, lotions or creams to surgical area.  ____  Please remove all jewelry, including piercings and leave at home.  ____  No money or valuables needed. Please leave at home.  ____  Please bring any documents given by your doctor.  ____  If going home the same day, arrange for a ride home. You will not be able to             drive if Anesthesia was used.  ____  Wear loose fitting clothing. Allow for dressings, bandages.  ____  Stop Aspirin, Ibuprofen, Motrin and Aleve at least 3-5 days before surgery, unless otherwise instructed by your doctor, or the nurse.   You MAY use Tylenol/acetaminophen until day of surgery.  ____  Wash the surgical area with Hibiclens the night before surgery, and again the             morning of surgery.  Be sure to rinse hibiclens off completely (if instructed by   nurse).  ____  If you take diabetic medication, do not take am of surgery unless instructed by Doctor.  ____  Call MD for temperature above 101 degrees or any other signs of infection such as Urinary (bladder) infection, Upper respiratory infection, skin boils, etc.  ____ Stop taking any Fish Oil supplement or any Vitamins that contain Vitamin E at least 5 days prior to surgery.  ____ Do Not wear your contact lenses the day of your procedure.  You may wear your glasses.      ____Do not shave surgical site for 3 days prior to surgery.  ____ Practice Good hand washing before, during, and after procedure.      I have read or had read and explained  to me, and understand the above information.  Additional comments or instructions:  For additional questions call [phone]      ANESTHESIA SIDE EFFECTS  -For the first 24 hours after surgery:  Do not drive, use heavy equipment, make important decisions, or drink alcohol  -It is normal to feel sleepy for several hours.  Rest until you are more awake.  -Have someone stay with you, if needed.  They can watch for problems and help keep you safe.  -Some possible post anesthesia side effects include: nausea and vomiting, sore throat and hoarseness, sleepiness, and dizziness.        Pre-Op Bathing Instructions    Before surgery, you can play an important role in your own health.    Because skin is not sterile, we need to be sure that your skin is as free of germs as possible. By following the instructions below, you can reduce the number of germs on your skin before surgery.    IMPORTANT: You will need to shower with a special soap called Hibiclens*, available at any pharmacy.  If you are allergic to Chlorhexidine (the antiseptic in Hibiclens), use an antibacterial soap such as Dial Soap for your preoperative shower.  You will shower with Hibiclens both the night before your surgery and the morning of your surgery.  Do not use Hibiclens on the head, face or genitals to avoid injury to those areas.    STEP #1: THE NIGHT BEFORE YOUR SURGERY     1. Do not shave the area of your body where your surgery will be performed.  2. Shower and wash your hair and body as usual with your normal soap and shampoo.  3. Rinse your hair and body thoroughly after you shower to remove all soap residue.  4. With your hand, apply one packet of Hibiclens soap to the surgical site.   5. Wash the site gently for five (5) minutes. Do not scrub your skin too hard.   6. Do not wash with your regular soap after Hibiclens is used.  7. Rinse your body thoroughly.  8. Pat yourself dry with a clean, soft towel.  9. Do not use lotion, cream, or  powder.  10. Wear clean clothes.    STEP #2: THE MORNING OF YOUR SURGERY     1. Repeat Step #1.    * Not to be used by people allergic to Chlorhexidine.

## 2020-08-24 NOTE — PROCEDURE NOTE: CLINICAL

## 2020-09-29 NOTE — PROCEDURE NOTE: CLINICAL

## 2020-09-29 NOTE — PATIENT DISCUSSION
EYLEA 2 MG THEN REPEAT EVERY 5 WKS  X 4 - TRACE EDEMA AT 6 WKS - RETX AND REPEAT IN 5 WKS - TO HOLD ON EXTENDING FURTHER AS SRH HAPPENED AT 6 WKS.

## 2020-09-29 NOTE — PATIENT DISCUSSION
[facility] -   General Surgery  Operative Note    SUMMARY     Date of Procedure: 5/11/2020     Procedure: Procedure(s) (LRB):  LAPAROTOMY, EXPLORATORY (N/A)  SPLENECTOMY (N/A)       Surgeon(s) and Role:     * Yumi Ferguson MD - Primary     * Zander Evans MD - Assisting        Pre-Operative Diagnosis: Injury of spleen, initial encounter [S36.00XA]    Post-Operative Diagnosis: Post-Op Diagnosis Codes:     * Injury of spleen, initial encounter [S36.00XA]    Anesthesia: General    Drains, Packs, Catheters: 19 Fr Kraig    Estimated Blood Loss (EBL): 200 mL           Implants: * No implants in log *    Specimens:   Specimen (12h ago, onward)    None           Description of the Findings of the Procedure: Splenic rupture with capsular tear at anterior superior border of the spleen. Reactive ascites. No massive hemoperitoneum. Abnormal tissue extending to the hilum, sent for frozen section. Unable to definitively determine if lymphoma on frozen section. Splenectomy performed. No other disease noted within the abdomen. IR US guided thoracentesis performed after the operation.     Significant Surgical Tasks Conducted by the Assistant(s), if Applicable: none    Complications: No    Procedure in detail:  The patient was taken the operating room and laid on the operating table in supine position.  General endotracheal anesthesia was administered.  Preoperative De León and SCDs were placed.  A bump was placed on the left lateral position.  Her abdomen was prepped and draped in sterile fashion with ChloraPrep solution.  Time-out was performed verifying patient identification, correct surgical site, IV antibiotic administration, and other pertinent details to the case.    Midline laparotomy was performed through skin and subcutaneous tissue and fascia.  The peritoneum was entered.  Ascites was encountered.  Omental adhesions from prior surgery were taken down from the anterior abdominal wall.  The spleen was  Recommend OBSERVATION and continued MONITORING for progression to exudative AMD. mobilized from its lateral attachments and elevated into the operative field.  Hilum was serially clamped and suture ligated.  Spleen was passed off the table as permanent specimen.  Splenic tissue at the hilar dissection appeared to have a abnormal appearance.  This was sent for frozen section with concern for lymphoma.  Hilar stump was inspected noted to be hemostatic.  The abdomen was irrigated.  The liver, stomach, bowel was inspected noted to have no other abnormalities.  A 19 Kraig drain was placed in the subhepatic space.  A tap block was performed in standard fashion.  Fascia was closed with looped PDS.  Skin was closed with staples.  She was transfused with 1 unit packed red blood cells intraoperatively.  She will be transferred back to the ICU in stable condition.    Condition: Stable    Disposition: ICU - guarded.    Attestation: I performed the procedure.    Yumi Ferguson

## 2020-10-30 NOTE — PATIENT DISCUSSION
2.  Dry Eye OU:  Start Restasis BID. Discussed that Restasis helps to increase the production of the patient's own tears and therefore can take 3-4 months for an improvement in symptoms. Use AT prn. 4.  Return for an appointment in 1 month for office call. with Dr. Teresita Patterson.

## 2020-10-30 NOTE — PATIENT DISCUSSION
1. Combined Types of Cataract OU: Monitor. 2. ARMD OS dry - Monitor. Follow up with Dr. Chris Rodriguez. 3. Exudative Macular Degeneration OD - Stable with inactive neovas. Follow up with Dr. Chris Rodriguez. 2. Dry Eye OU:  Start FML BID X 2W then QHS X 2W. Continue ATs during the day. May need cyclosporine or Regener-Eyes tx if no improvement. 4. Return for an appointment in 1 month for office call to see if FML and ATs relieving dry eyes symptoms with Dr. Obed Crane.

## 2020-10-30 NOTE — PATIENT DISCUSSION
Retinal Pigment Changes OU - Pt was found to have pigmentary changes of the macula that are non-specific. These may be age related benign changes or the first signs of macular degeneration. Patient was advised to keep good followup to deturmine their etiology over time.

## 2020-10-30 NOTE — PATIENT DISCUSSION
Exudative Macular Degeneration OD - The importance of followup visits with retina and continued treament was discussed. If becomes active again will need anti VEGF. amsler grid monitoring. Pt to call immediately with new metapmorphopsia or decreased vision.

## 2020-11-03 NOTE — PROCEDURE NOTE: CLINICAL

## 2020-12-04 NOTE — PATIENT DISCUSSION
1. Combined Types of Cataract OU: Monitor. 2. ARMD OS dry - Monitor. Follow up with Dr. Vijay Garcia. 3. Exudative Macular Degeneration OD - Stable with inactive neovas. Follow up with Dr. Vijay Garcia. 2. Dry Eye OU:  Continue FML QHS. Continue ATs during the day at least BID. May need cyclosporine or Regener-Eyes tx if no improvement. 4. Return for an appointment in 4 months for office call.

## 2020-12-08 NOTE — PROCEDURE NOTE: CLINICAL
PROCEDURE NOTE: Eylea PFS OD. Diagnosis: Neovascular AMD with Active CNV. Anesthesia: Topical. Prep: Betadine Drops. Prior to injection, risks/benefits/alternatives discussed including but not limited to infection, loss of vision or eye, hemorrhage, cataract, glaucoma, retinal tears or detachment. The patient wished to proceed with treatment. Betadine prep was performed. Topical anesthesia was induced with Alcaine. Additional anesthesia was achieved using drop(s) or injection checked above. A drop of Povidone-iodine 5% ophthalmic solution was instilled over the injection site and in the inferior fornix. A single use prefilled syringe of intravitreal Eylea 2mg/0.05ml was used and excess was disposed of as waste. The needle was passed 3.0 mm posterior to the limbus in pseudophakic patients, and 3.5 mm posterior to the limbus in phakic patients. Injection time: 2:15AM.  Patient tolerated procedure well. There were no complications. The eye was irrigated with sterile irrigating solution. Post procedure instructions given. The patient was instructed to use Artificial Tears q.i.d. p.r.n for comfort. The patient was instructed to return for re-evaluation in approximately 4-12 weeks depending on his/her condition and was told to call immediately if vision decreases and/or if his/her eye becomes red, painful, and/or light sensitive. The patient was instructed to go to the emergency room or call 911 if unable to reach the doctor within an hour or two of trying or calling. Crow Conn

## 2021-02-11 NOTE — PATIENT DISCUSSION
BMI above normal limits, recommended weight loss, improve diet and follow up with internist. [facility] to be [facility] to Room 5123 via stretcher by transport tech. Patient transported with bedside cardiac monitor and with IV medications infusing. IV site clean, dry, and intact. MEWS score assessed and documented. Updated patient and family on plan of care.        Chelsy Rebolledo RN  02/10/21 2027

## 2021-02-17 NOTE — PROCEDURE NOTE: CLINICAL
PROCEDURE NOTE: Eylea PFS OD. Diagnosis: Neovascular AMD with Active CNV. Anesthesia: Topical. Prep: Betadine Drops. Prior to injection, risks/benefits/alternatives discussed including but not limited to infection, loss of vision or eye, hemorrhage, cataract, glaucoma, retinal tears or detachment. The patient wished to proceed with treatment. Betadine prep was performed. Topical anesthesia was induced with Alcaine. Additional anesthesia was achieved using drop(s) or injection checked above. A drop of Povidone-iodine 5% ophthalmic solution was instilled over the injection site and in the inferior fornix. A single use prefilled syringe of intravitreal Eylea 2mg/0.05ml was used and excess was disposed of as waste. The needle was passed 3.0 mm posterior to the limbus in pseudophakic patients, and 3.5 mm posterior to the limbus in phakic patients. Injection time: 200PM.  Patient tolerated procedure well. There were no complications. The eye was irrigated with sterile irrigating solution. Post procedure instructions given. The patient was instructed to use Artificial Tears q.i.d. p.r.n for comfort. The patient was instructed to return for re-evaluation in approximately 4-12 weeks depending on his/her condition and was told to call immediately if vision decreases and/or if his/her eye becomes red, painful, and/or light sensitive. The patient was instructed to go to the emergency room or call 911 if unable to reach the doctor within an hour or two of trying or calling. Alean Garcia

## 2021-02-17 NOTE — PATIENT DISCUSSION
EYLEA 2 MG THEN REPEAT EVERY 5 WKS  X 4 - TRACE EDEMA AT 5 WKS - RETX AND REPEAT IN 5 WKS - TO HOLD ON EXTENDING FURTHER AS SRH HAPPENED AT 6 WKS.

## 2021-02-26 NOTE — PATIENT DISCUSSION
11 29 18 EYLEA OD AND RPEEAT EVERY 6 WKS X 3 - TRACE EDEMA AT 7 WKS - RETX AND KEEP 6 WKS AS STILL FLUID. O-L Flap Text: The defect edges were debeveled with a #15 scalpel blade.  Given the location of the defect, shape of the defect and the proximity to free margins an O-L flap was deemed most appropriate.  Using a sterile surgical marker, an appropriate advancement flap was drawn incorporating the defect and placing the expected incisions within the relaxed skin tension lines where possible.    The area thus outlined was incised deep to adipose tissue with a #15 scalpel blade.  The skin margins were undermined to an appropriate distance in all directions utilizing iris scissors.

## 2021-03-24 NOTE — PROCEDURE NOTE: CLINICAL
PROCEDURE NOTE: Eylea PFS OD. Diagnosis: Neovascular AMD with Active CNV. Anesthesia: Topical. Prep: Betadine Drops. Prior to injection, risks/benefits/alternatives discussed including but not limited to infection, loss of vision or eye, hemorrhage, cataract, glaucoma, retinal tears or detachment. The patient wished to proceed with treatment. Betadine prep was performed. Topical anesthesia was induced with Alcaine. Additional anesthesia was achieved using drop(s) or injection checked above. A drop of Povidone-iodine 5% ophthalmic solution was instilled over the injection site and in the inferior fornix. A single use prefilled syringe of intravitreal Eylea 2mg/0.05ml was used and excess was disposed of as waste. The needle was passed 3.0 mm posterior to the limbus in pseudophakic patients, and 3.5 mm posterior to the limbus in phakic patients. Injection time: 3:55 PM.  Patient tolerated procedure well. There were no complications. The eye was irrigated with sterile irrigating solution. Post procedure instructions given. The patient was instructed to use Artificial Tears q.i.d. p.r.n for comfort. The patient was instructed to return for re-evaluation in approximately 4-12 weeks depending on his/her condition and was told to call immediately if vision decreases and/or if his/her eye becomes red, painful, and/or light sensitive. The patient was instructed to go to the emergency room or call 911 if unable to reach the doctor within an hour or two of trying or calling. Theresa Byrne

## 2021-04-28 NOTE — PROCEDURE NOTE: CLINICAL

## 2021-06-09 NOTE — PROCEDURE NOTE: CLINICAL

## 2021-06-09 NOTE — PATIENT DISCUSSION
The patient is at high risk for a choroidal neovascular membrane. NO CNV SEEN TODAY. Dry ARMD is responsible for some decrease in vision. 5NOR/500C

## 2021-07-19 NOTE — PROCEDURE NOTE: CLINICAL

## 2021-08-23 NOTE — PROCEDURE NOTE: CLINICAL

## 2021-09-27 NOTE — PROCEDURE NOTE: CLINICAL

## 2021-11-08 NOTE — PROCEDURE NOTE: CLINICAL

## 2021-12-13 NOTE — PROCEDURE NOTE: CLINICAL

## 2022-01-17 NOTE — PROCEDURE NOTE: CLINICAL

## 2022-01-26 NOTE — PATIENT DISCUSSION
EYLEA 2 MG THEN REPEAT EVERY 5 WKS  X 4 - TRACE EDEMA AT 6 WKS - RETX AND REPEAT IN 5 WKS - TO HOLD ON EXTENDING FURTHER AS SRH HAPPENED AT 6 WKS. Hemostasis: Electrocautery

## 2022-01-28 NOTE — PATIENT DISCUSSION
11 29 18 EYLEA OD AND RPEEAT EVERY 6 WKS X 3 - TRACE EDEMA AT 7 WKS - RETX AND KEEP 6 WKS AS STILL FLUID.
5 28 19 EYLEA AND REPEAT EVERY 7 WKS X 3 - TRACE FLUID AT 7 WKS  - RETX AND KEEP 7 WKS AS DOING WELL AT 7 WKS.
7 9 19 PNEUMATIC GAS DISPLACEMENT - SRH AT 6 WKS.
ARTIFICIAL TEARS to affected eye(s) as needed.
BMI above normal limits, recommended weight loss, improve diet and follow up with internist.
Cataract DOES NOT appear visually significant.
Continue to MONITOR CLOSELY to determine the need for TREATMENT and INCREASE/DECREASE in length of time till next follow up visit.
Does NOT APPEAR VISUALLY SIGNIFICANT.
ERM does NOT APPEAR VISUALLY SIGNIFICANT.
EYLEA 2 MG THEN REPEAT EVERY 5 WKS  X 4 - TRACE EDEMA AT 5 WKS - RETX AND REPEAT IN 5 WKS - TO HOLD ON EXTENDING FURTHER AS SRH HAPPENED AT 6 WKS.
EYLEA AND REPEAT EVERY 6 WKS X 3 - TRACED SRF AT 6 WKS - DOING BETTER THAN AT 8 WKS ON 4 10 18 - REVIEWED RISK OF INFLAMMATION AND PATIENT ACCEPTS RISK AND WANTS TO STICK WITH EYLEA AS SHE DOES NOT WANT TO TAKE CHANGE THAT SHE WOULD REQUIRE MORE FREQUENT TX WITH ANOTHER MEDICATION.
HAS HAD IRRITATION AFTER KALE - TO TRY BCL.
MONITOR response to TREATMENT.
My findings and recommendations TODAY are based on the patient's symptoms, exam, diagnostic testing and records.
My findings and recommendations are based on patient's symptoms, eye exam, diagnostic testing, and records.
NO PROGRESSION ON OCT.
NO RECURRENT SRH.
NONSMOKER.
No retinal holes or tears seen on exam. Recommended observation. We reviewed the signs and symptoms of retinal tear/retinal detachment and the importance of prompt evaluation should there be increasing floaters, new flashing lights, or decreasing peripheral vision in either eye at any time. Patient understands condition, prognosis and need for follow up care.
No retinal tears or retinal detachment seen on clinical exam today. Reviewed the signs and symptoms of retinal tear/retinal detachment and the importance of calling for prompt evaluation should there be increasing floaters, new flashing lights, or decreasing peripheral vision in either eye at any time. Observation recommended.
POST INJECTION EVALUATION, no signs of new infection, tear, RD, VF, EOM, CNS, Vascular or other problems or side effect from previous injection(s).
PT REPORTS ON 7 11 18 THAT SHE DID MUCH BETTER WITH BCL - BCL REMOVED WITHOUT COMPLICATION.
Recommend OBSERVATION and continued MONITORING for progression to exudative AMD.
Recommend OBSERVATION and continued MONITORING for progression.
Recommended OBSERVATION TODAY and Monitoring for PROGRESSION.
Recommended OBSERVATION and continued MONITORING for progression.
Recommended observation.
Recommended weight and BMI control through healthy diet and exercise, green leafy veggies, UV protection, and not smoking. Reviewed the benefits of AREDS II VITAMINS VERUS GENOTYPE directed vitamin therapy, and recommended following one or the other to try and prevent the progression of the disease. Reviewed the importance of daily monitoring of the vision in each eye independently, along with the use of the Amsler grid daily and instructed patient to call and return immediately for any new changes in their vision or on the Amsler grid. Patient instructed on the importance of regular follow up and monitoring for the early detection of conversion to wet AMD as early detection results in early treatment and better outcomes.
The patient is at high risk for a choroidal neovascular membrane. NO CNV SEEN TODAY. Dry ARMD is responsible for some decrease in vision.
negative

## 2022-01-31 NOTE — PATIENT DISCUSSION
Continue to MONITOR CLOSELY to determine the need for TREATMENT and INCREASE/DECREASE in length of time till next follow up visit. Prep: The treated area was degreased with pre-peel cleanser, and vaseline was applied for protection of mucous membranes. Chemical Peel: Micropeel Plus 20 Solution Post-Care Instructions: I reviewed with the patient in detail post-care instructions. Patient should avoid sun exposure and wear sun protection. Detail Level: Zone Treatment Time (Optional): 10 Post Peel Care: After the procedure, a post-peel cream was applied to the treated areas. Sun protection and post-care instructions were reviewed with the patient. Number Of Layers: 2 Consent: Prior to the procedure, written consent was obtained and risks were reviewed, including but not limited to: redness, peeling, blistering, pigmentary change, scarring, infection, and pain.

## 2022-02-28 NOTE — PROCEDURE NOTE: CLINICAL

## 2022-03-22 ENCOUNTER — NEW PATIENT (OUTPATIENT)
Dept: URBAN - NONMETROPOLITAN AREA CLINIC 4 | Facility: CLINIC | Age: 62
End: 2022-03-22

## 2022-03-22 DIAGNOSIS — H52.4: ICD-10-CM

## 2022-03-22 PROCEDURE — 92004 COMPRE OPH EXAM NEW PT 1/>: CPT

## 2022-03-22 PROCEDURE — 92015 DETERMINE REFRACTIVE STATE: CPT

## 2022-03-22 ASSESSMENT — TONOMETRY
OD_IOP_MMHG: 21
OS_IOP_MMHG: 20

## 2022-03-22 ASSESSMENT — VISUAL ACUITY
OS_CC: 20/20
OU_CC: J2
OD_CC: 20/25

## 2022-04-04 NOTE — PROCEDURE NOTE: CLINICAL

## 2022-04-06 ENCOUNTER — FOLLOW UP (OUTPATIENT)
Dept: URBAN - NONMETROPOLITAN AREA CLINIC 4 | Facility: CLINIC | Age: 62
End: 2022-04-06

## 2022-04-06 DIAGNOSIS — H40.1131: ICD-10-CM

## 2022-04-06 PROCEDURE — 92133 CPTRZD OPH DX IMG PST SGM ON: CPT

## 2022-04-06 PROCEDURE — 76514 ECHO EXAM OF EYE THICKNESS: CPT

## 2022-04-06 PROCEDURE — 99213 OFFICE O/P EST LOW 20 MIN: CPT

## 2022-04-06 ASSESSMENT — VISUAL ACUITY
OD_CC: 20/20
OS_CC: 20/20

## 2022-04-06 ASSESSMENT — TONOMETRY
OD_IOP_MMHG: 19
OS_IOP_MMHG: 20

## 2022-05-16 NOTE — PROCEDURE NOTE: CLINICAL

## 2022-06-01 ENCOUNTER — DIAGNOSTICS ONLY (OUTPATIENT)
Dept: URBAN - NONMETROPOLITAN AREA CLINIC 4 | Facility: CLINIC | Age: 62
End: 2022-06-01

## 2022-07-12 ENCOUNTER — FOLLOW UP (OUTPATIENT)
Dept: URBAN - NONMETROPOLITAN AREA CLINIC 4 | Facility: CLINIC | Age: 62
End: 2022-07-12

## 2022-07-12 DIAGNOSIS — E11.9: ICD-10-CM

## 2022-07-12 DIAGNOSIS — Z79.4: ICD-10-CM

## 2022-07-12 DIAGNOSIS — H40.1131: ICD-10-CM

## 2022-07-12 PROCEDURE — 92014 COMPRE OPH EXAM EST PT 1/>: CPT

## 2022-07-12 ASSESSMENT — VISUAL ACUITY
OD_CC: 20/20
OS_CC: 20/20
OU_CC: 20/25
OU_CC: 20/20

## 2022-07-12 ASSESSMENT — TONOMETRY
OD_IOP_MMHG: 17
OS_IOP_MMHG: 18

## 2022-07-15 NOTE — PROCEDURE NOTE: CLINICAL

## 2022-09-19 NOTE — PROCEDURE NOTE: CLINICAL

## 2022-11-21 NOTE — PROCEDURE NOTE: CLINICAL

## 2022-11-30 ENCOUNTER — FOLLOW UP (OUTPATIENT)
Dept: URBAN - NONMETROPOLITAN AREA CLINIC 4 | Facility: CLINIC | Age: 62
End: 2022-11-30

## 2022-11-30 DIAGNOSIS — H40.1131: ICD-10-CM

## 2022-11-30 PROCEDURE — 92133 CPTRZD OPH DX IMG PST SGM ON: CPT

## 2022-11-30 PROCEDURE — 92012 INTRM OPH EXAM EST PATIENT: CPT

## 2022-11-30 ASSESSMENT — VISUAL ACUITY
OD_SC: 20/30-2
OS_SC: 20/40-2
OS_CC: 20/20
OD_CC: 20/20

## 2022-11-30 ASSESSMENT — TONOMETRY
OS_IOP_MMHG: 19
OD_IOP_MMHG: 18

## 2023-04-28 NOTE — PATIENT DISCUSSION
My findings and recommendations are based on patient's symptoms, eye exam, diagnostic testing, and records. potassium chloride ER (KLOR-CON M) 20 MEQ CR tablet 360 tablet 1 3/24/2023  No   Sig: TAKE 2 TABLETS BY MOUTH TWICE DAILY     Pt told to call pharmacy. Refills were approved 3/24/23.  Clinic staff can call when pharmacy opens if patient having difficulties.

## 2024-02-19 NOTE — PATIENT DISCUSSION
ON 9 27 7 EYLEA AND REPEAT IN 9 WKS X 2  - LESS SRF AT 9-10 WKS THEN 10 WKS ON 12 14 16 - TO KEEP 9 WKS AS TRACE FLUID AT 9 AND VA GOOD. 19-Feb-2024 12:08

## 2024-03-29 ENCOUNTER — COMPREHENSIVE EXAM (OUTPATIENT)
Dept: RURAL CLINIC 1 | Facility: CLINIC | Age: 64
End: 2024-03-29

## 2024-03-29 DIAGNOSIS — H40.1131: ICD-10-CM

## 2024-03-29 DIAGNOSIS — H25.13: ICD-10-CM

## 2024-03-29 DIAGNOSIS — E11.9: ICD-10-CM

## 2024-03-29 DIAGNOSIS — Z79.4: ICD-10-CM

## 2024-03-29 PROCEDURE — 92133 CPTRZD OPH DX IMG PST SGM ON: CPT

## 2024-03-29 PROCEDURE — 92014 COMPRE OPH EXAM EST PT 1/>: CPT

## 2024-03-29 ASSESSMENT — TONOMETRY
OD_IOP_MMHG: 18
OS_IOP_MMHG: 19

## 2024-03-29 ASSESSMENT — VISUAL ACUITY
OS_CC: 20/20
OD_CC: 20/20
OU_CC: 20/20

## 2024-07-02 NOTE — PATIENT DISCUSSION
ON 2 13 18 - EYLEA AND REPEAT IN 8 WKS - MILD SRF AT 9 WKS - MILD INCREASE - THEREFORE RETX AND REDUCE F/U TO 8 WKS. Continue Regimen: Opzelura topical cream BID PRN Detail Level: Detailed Continue Regimen: Gabapentin 100 mg PO q hs PRN

## 2024-08-18 NOTE — PATIENT DISCUSSION
The patient is at high risk for a choroidal neovascular membrane. NO CNV SEEN TODAY. Dry ARMD is responsible for some decrease in vision. soft

## 2024-11-17 NOTE — PATIENT DISCUSSION
POST INJECTION EVALUATION, no signs of new infection, tear, RD, VF, EOM, CNS, Vascular or other problems or side effect from previous injection(s). 04-Nov-2024

## 2025-03-28 NOTE — PATIENT DISCUSSION
Recommended observation. Spoke with patient to see if she had any last minute surgery questions.  She did not but states she is going to bring apple juice with her since she is a diabetic.  Discussed NPO after midnight and did not eat or drink anything until she is confirmation from a staff member in the hospital. Patient v/u.

## 2025-06-27 ENCOUNTER — COMPREHENSIVE EXAM (OUTPATIENT)
Age: 65
End: 2025-06-27

## 2025-06-27 DIAGNOSIS — H25.13: ICD-10-CM

## 2025-06-27 DIAGNOSIS — Z79.4: ICD-10-CM

## 2025-06-27 DIAGNOSIS — H40.1131: ICD-10-CM

## 2025-06-27 DIAGNOSIS — E11.9: ICD-10-CM

## 2025-06-27 DIAGNOSIS — H52.4: ICD-10-CM

## 2025-06-27 PROCEDURE — 99214 OFFICE O/P EST MOD 30 MIN: CPT

## 2025-06-27 PROCEDURE — 92015 DETERMINE REFRACTIVE STATE: CPT

## 2025-06-27 PROCEDURE — 92133 CPTRZD OPH DX IMG PST SGM ON: CPT
